# Patient Record
Sex: FEMALE | Race: WHITE | NOT HISPANIC OR LATINO | Employment: OTHER | URBAN - METROPOLITAN AREA
[De-identification: names, ages, dates, MRNs, and addresses within clinical notes are randomized per-mention and may not be internally consistent; named-entity substitution may affect disease eponyms.]

---

## 2018-01-18 ENCOUNTER — OFFICE VISIT CONVERTED (OUTPATIENT)
Dept: GASTROENTEROLOGY | Facility: CLINIC | Age: 53
End: 2018-01-18
Attending: INTERNAL MEDICINE

## 2019-02-07 ENCOUNTER — OFFICE VISIT CONVERTED (OUTPATIENT)
Dept: GASTROENTEROLOGY | Facility: CLINIC | Age: 54
End: 2019-02-07
Attending: PHYSICIAN ASSISTANT

## 2019-03-06 ENCOUNTER — HOSPITAL ENCOUNTER (OUTPATIENT)
Dept: GENERAL RADIOLOGY | Facility: HOSPITAL | Age: 54
Discharge: HOME | End: 2019-03-06
Attending: NURSE PRACTITIONER

## 2019-03-21 ENCOUNTER — CONVERSION ENCOUNTER (OUTPATIENT)
Dept: NEUROLOGY | Facility: CLINIC | Age: 54
End: 2019-03-21

## 2019-03-21 ENCOUNTER — OFFICE VISIT CONVERTED (OUTPATIENT)
Dept: NEUROSURGERY | Facility: CLINIC | Age: 54
End: 2019-03-21
Attending: PHYSICIAN ASSISTANT

## 2019-04-03 ENCOUNTER — HOSPITAL ENCOUNTER (OUTPATIENT)
Dept: MRI IMAGING | Facility: HOSPITAL | Age: 54
Discharge: HOME | End: 2019-04-03
Attending: PHYSICIAN ASSISTANT

## 2019-04-17 ENCOUNTER — OFFICE VISIT CONVERTED (OUTPATIENT)
Dept: NEUROSURGERY | Facility: CLINIC | Age: 54
End: 2019-04-17
Attending: PHYSICIAN ASSISTANT

## 2019-05-09 ENCOUNTER — HOSPITAL ENCOUNTER (OUTPATIENT)
Dept: OTHER | Facility: HOSPITAL | Age: 54
Discharge: HOME | End: 2019-05-09
Attending: PHYSICIAN ASSISTANT

## 2019-05-29 ENCOUNTER — OFFICE VISIT CONVERTED (OUTPATIENT)
Dept: NEUROSURGERY | Facility: CLINIC | Age: 54
End: 2019-05-29
Attending: PHYSICIAN ASSISTANT

## 2020-02-12 ENCOUNTER — CONVERSION ENCOUNTER (OUTPATIENT)
Dept: GASTROENTEROLOGY | Facility: CLINIC | Age: 55
End: 2020-02-12

## 2020-02-12 ENCOUNTER — OFFICE VISIT CONVERTED (OUTPATIENT)
Dept: GASTROENTEROLOGY | Facility: CLINIC | Age: 55
End: 2020-02-12
Attending: PHYSICIAN ASSISTANT

## 2020-05-13 ENCOUNTER — TELEPHONE CONVERTED (OUTPATIENT)
Dept: GASTROENTEROLOGY | Facility: CLINIC | Age: 55
End: 2020-05-13
Attending: PHYSICIAN ASSISTANT

## 2020-07-23 ENCOUNTER — OFFICE VISIT CONVERTED (OUTPATIENT)
Dept: ORTHOPEDIC SURGERY | Facility: CLINIC | Age: 55
End: 2020-07-23
Attending: ORTHOPAEDIC SURGERY

## 2020-10-15 ENCOUNTER — OFFICE VISIT CONVERTED (OUTPATIENT)
Dept: ORTHOPEDIC SURGERY | Facility: CLINIC | Age: 55
End: 2020-10-15
Attending: ORTHOPAEDIC SURGERY

## 2020-12-14 ENCOUNTER — OFFICE VISIT CONVERTED (OUTPATIENT)
Dept: NEUROSURGERY | Facility: CLINIC | Age: 55
End: 2020-12-14
Attending: PHYSICIAN ASSISTANT

## 2021-01-04 ENCOUNTER — HOSPITAL ENCOUNTER (OUTPATIENT)
Dept: MRI IMAGING | Facility: HOSPITAL | Age: 56
Discharge: HOME | End: 2021-01-04
Attending: PHYSICIAN ASSISTANT

## 2021-01-14 ENCOUNTER — OFFICE VISIT CONVERTED (OUTPATIENT)
Dept: GASTROENTEROLOGY | Facility: CLINIC | Age: 56
End: 2021-01-14
Attending: NURSE PRACTITIONER

## 2021-01-27 ENCOUNTER — OFFICE VISIT CONVERTED (OUTPATIENT)
Dept: NEUROSURGERY | Facility: CLINIC | Age: 56
End: 2021-01-27
Attending: PHYSICIAN ASSISTANT

## 2021-05-10 NOTE — H&P
History and Physical      Patient Name: Leonel Henderson   Patient ID: [id number]   Sex: Female   YOB: 1965    Primary Care Provider: Tracey MANZANO   Referring Provider: Tracey MANZANO    Visit Date: July 23, 2020    Provider: Arina Vaughn MD   Location: [facility]   Location Address: [address] [place] [address]  [id number]   Location Phone: [phone]          Chief Complaint  · Bilateral hand pain      History Of Present Illness  Leonel Henderson is a 55 year old /White female who presents today to [facility].      The patient presents today for evaluation of bilateral hands. She reports the hands feel swollen to her but don't appear swollen. She reports the second and third finger on the right rotation when she makes a fist. She has some numbness and tingling to her fourth and fifth fingers. She had a previous carpal tunnel release by myself about 4 to 5 years ago and that is doing reasonably well.       Past Medical History  Abdominal pain; Aftercare following left carpal tunnel release; Arthritis; Asthma; Bladder Disorder; Bladder problem; Bowel Disease; Cancer; Carpal tunnel syndrome; Constipation; Degenerative Disc Disease ; GERD (gastroesophageal reflux disease); Hyperlipemia; Hyperlipidemia; Hypertension; Hypertension, Benign Essential; Leg pain; Lumbago/low back pain; Seasonal allergies         Past Surgical History  carpal tunnel; Carpal Tunnel Release; Cesarian Section; Cholecystecomy; Colon; Colonoscopy; EGD; Gallbladder; Hysterectomy; Tubal ligation         Medication List  amitriptyline 75 mg oral tablet; Aspir-81 81 mg oral tablet,delayed release (DR/EC); baclofen 10 mg oral tablet; Benadryl 25 mg oral capsule; colestipol 1 gram oral tablet; diclofenac sodium 75 mg oral tablet,delayed release (DR/EC); dicyclomine 20 mg oral tablet; duloxetine 30 mg oral capsule,delayed release(DR/EC); hyoscyamine sulfate 0.125 mg oral tablet;  "hyoscyamine sulfate 0.375 mg oral tablet extended release 12 hr; ibuprofen 800 mg oral tablet; Librax (with clidinium) 5-2.5 mg oral capsule; lisinopril 40 mg oral tablet; Movantik 25 mg oral tablet; Norco 7.5-325 mg oral tablet; pantoprazole 40 mg oral tablet,delayed release (DR/EC); pravastatin 40 mg oral tablet; propantheline 15 mg oral tablet; tizanidine 4 mg oral capsule; Xifaxan 550 mg oral tablet         Allergy List  NO KNOWN DRUG ALLERGIES       Allergies Reconciled  Family Medical History  Diabetes, unspecified type; - No Family History of Colorectal Cancer; Family history of certain chronic disabling diseases; arthritis; Family history of Arthritis; Family history of cancer; Family history of heart disease; Family history of diabetes mellitus         Social History  Alcohol (Never); Alcohol Use (Never); lives with spouse; ; .; Recreational Drug Use (Never); Tobacco (Never); Working         Review of Systems  · Constitutional  o Denies  o : fever, chills, weight loss  · Cardiovascular  o Denies  o : chest pain, shortness of breath  · Gastrointestinal  o Denies  o : liver disease, heartburn, nausea, blood in stools  · Genitourinary  o Denies  o : painful urination, blood in urine  · Integument  o Denies  o : rash, itching  · Neurologic  o Denies  o : headache, weakness, loss of consciousness  · Musculoskeletal  o Denies  o : painful, swollen joints  · Psychiatric  o Denies  o : drug/alcohol addiction, anxiety, depression      Vitals  Date Time BP Position Site L\R Cuff Size HR RR TEMP (F) WT  HT  BMI kg/m2 BSA m2 O2 Sat        07/23/2020 01:55 PM      81 - R   250lbs 0oz 5'  2\" 45.73 2.23 97 %          Physical Examination  · Constitutional  o Appearance  o : well developed, well-nourished, no obvious deformities present  · Head and Face  o Head  o :   § Inspection  § : normocephalic  o Face  o :   § Inspection  § : no facial lesions  · Eyes  o Conjunctivae  o : conjunctivae " normal  o Sclerae  o : sclerae white  · Ears, Nose, Mouth and Throat  o Ears  o :   § External Ears  § : appearance within normal limits  § Hearing  § : intact  o Nose  o :   § External Nose  § : appearance normal  · Neck  o Inspection/Palpation  o : normal appearance  o Range of Motion  o : full range of motion  · Respiratory  o Respiratory Effort  o : breathing unlabored  o Inspection of Chest  o : normal appearance  o Auscultation of Lungs  o : no audible wheezing or rales  · Cardiovascular  o Heart  o : regular rate  · Gastrointestinal  o Abdominal Examination  o : soft and non-tender  · Skin and Subcutaneous Tissue  o General Inspection  o : intact, no rashes  · Psychiatric  o General  o : Alert and oriented x3  o Judgement and Insight  o : judgment and insight intact  o Mood and Affect  o : mood normal, affect appropriate  · Extremities  o Extremities  o : scars are well-healed, good ROM, no obvious malrotation with making a fist, complains of some numbness and tingling to the 4th and 5th digits, negative Tinel's at elbow, sensation is otherwise intact          Assessment  · Pain: Wrist and hands, bilateral     719.43/M25.539  · Cubital tunnel syndrome of both upper extremities     354.2/G56.23      Plan  · Instructions  o Reviewed the patient's Past Medical, Social, and Family history as well as the ROS at today's visit, no changes.  o Call or return if worsening symptoms.  o Follow up as needed.  o This note was transcribed by Jacinta Gomez.   o Plan for anti-inflammatory to help calm down her hand osteoarthritis. May consider EMG for cubital tunnel if symptoms worsen.             Electronically Signed by: Jacinta Gomez-, Other -Author on July 24, 2020 11:13:11 PM  Electronically Co-signed by: Arina Vaughn MD -Reviewer on July 31, 2020 08:54:25 AM

## 2021-05-13 NOTE — PROGRESS NOTES
Progress Note      Patient Name: Leonel Henderson   Patient ID: [id number]   Sex: Female   YOB: 1965    Primary Care Provider: Tracey MANZANO   Referring Provider: Tracey MANZANO    Visit Date: December 14, 2020    Provider: Kimberly Dunn PA-C   Location: [facility]   Location Address: [address] [place] [address]  [id number]   Location Phone: [phone]          Chief Complaint  · Lumbago      History Of Present Illness     Patient presents today with chronic, worsening low back pain. Patient states the pain she is experiencing is effecting both her upper and lower extremities bilaterally. Patient states she experiences a significant amount of numbness and tingling in the hands and feet and that her legs and arms keep a constant aching, cramping feeling in them. Pt notes that she has been having numbness in little two fingers bilaterally for 5 months. She complains of tingling in feet and pain in calves for 3 months.  EMG showed carpal tunnel syndrome.       Past Medical History  Abdominal pain; Aftercare following left carpal tunnel release; Arthritis; Asthma; Bladder Disorder; Bladder problem; Bowel Disease; Cancer; Carpal tunnel syndrome; Constipation; Degenerative Disc Disease ; GERD (gastroesophageal reflux disease); Hyperlipemia; Hyperlipidemia; Hypertension; Hypertension, Benign Essential; Leg pain; Lumbago/low back pain; Seasonal allergies         Past Surgical History  carpal tunnel; Carpal Tunnel Release; Cesarian Section; Cholecystecomy; Colon; Colonoscopy; EGD; Gallbladder; Hysterectomy; Tubal ligation         Medication List  amitriptyline 75 mg oral tablet; Aspir-81 81 mg oral tablet,delayed release (DR/EC); baclofen 10 mg oral tablet; Benadryl 25 mg oral capsule; colestipol 1 gram oral tablet; diclofenac sodium 75 mg oral tablet,delayed release (DR/EC); DICLOFENAC SODIUM 75MG DR TABLETS; dicyclomine 20 mg oral tablet; docusate  sodium 100 mg oral capsule; doxycycline hyclate 100 mg oral capsule; duloxetine 30 mg oral capsule,delayed release(DR/EC); hydrocodone-acetaminophen 7.5-325 mg oral tablet; hyoscyamine sulfate 0.125 mg oral tablet; hyoscyamine sulfate 0.375 mg oral tablet extended release 12 hr; ibuprofen 800 mg oral tablet; Librax (with clidinium) 5-2.5 mg oral capsule; lisinopril 40 mg oral tablet; montelukast 10 mg oral tablet; Movantik 25 mg oral tablet; Norco 7.5-325 mg oral tablet; pantoprazole 40 mg oral tablet,delayed release (DR/EC); pravastatin 40 mg oral tablet; propantheline 15 mg oral tablet; tizanidine 4 mg oral capsule; Xifaxan 550 mg oral tablet         Allergy List  NO KNOWN DRUG ALLERGIES         Family Medical History  Diabetes, unspecified type; - No Family History of Colorectal Cancer; Family history of certain chronic disabling diseases; arthritis; Family history of Arthritis; Family history of cancer; Family history of heart disease; Family history of diabetes mellitus         Social History  Alcohol (Never); Alcohol Use (Never); lives with spouse; ; .; Recreational Drug Use (Never); Tobacco (Never); Working         Review of Systems  · Constitutional  o Admits  o : fatigue, weight gain  o Denies  o : fever, headache, chills  · Eyes  o Denies  o : eye pain, double vision, blurred vision  · HENT  o Admits  o : headaches, sinus pain, nasal congestion, ear pain  o Denies  o : sinus problems, sore throat, ear infection  · Cardiovascular  o Denies  o : chest pain, high blood pressure, varicosities  · Respiratory  o Denies  o : shortness of breath, wheezing, frequent cough  · Gastrointestinal  o Denies  o : nausea, vomiting, heartburn, indigestion, abdominal pain  · Genitourinary  o Denies  o : urgency, frequency, urinary retention, painful urination  · Integument  o Denies  o : rash, itching, boils  · Neurologic  o Admits  o : tingling or numbness, loss of balance  o Denies  o : tremors, dizzy  "spells  · Musculoskeletal  o Admits  o : sciatica (pain radiating to leg), back pain, neck pain  · Endocrine  o Denies  o : cold intolerance, heat intolerance, tired, excessive thirst, sluggish  · Psychiatric  o Admits  o : depression, feels satisfied with life  o Denies  o : severe depression, concerns with hurting themselves  · Heme-Lymph  o Denies  o : swollen glands, blood clotting problems  · Allergic-Immunologic  o Denies  o : sinus allergy symptoms, hay fever      Vitals  Date Time BP Position Site L\R Cuff Size HR RR TEMP (F) WT  HT  BMI kg/m2 BSA m2 O2 Sat FR L/min FiO2 HC       12/14/2020 01:53 /57 Sitting    76 - R  97.5 277lbs 5oz 5'  2\" 50.72 2.35             Physical Examination     Unable to make fists bilaterally. Altered sensation in two small fingers bilaterally. Altered sensation on bottom of feet. Normal reflexes in arms and legs. Normal strength in arms and legs, except for  strength is weakened. Tenderness diffusely in lower back. Tenderness at C6 level in neck.           Assessment  · Lumbago/low back pain     724.3/M54.40  · Sciatica     724.3/M54.30  · Facet arthritis of lumbar region     721.3/M46.96  · Paresthesia     782.0/R20.2  hands and feet  · Cervicalgia     723.1/M54.2      Plan  · Orders  o MRI lumbar spine w/o contrast (71267) - 724.3/M54.40, 724.3/M54.30, 721.3/M46.96, 782.0/R20.2 - 12/14/2020  o MRI cervical spine w/o contrast (58510) - 723.1/M54.2, 782.0/R20.2 - 12/14/2020  · Medications  o Medications have been Reconciled  o Transition of Care or Provider Policy  · Instructions  o Will order MRI of Lspine and MRI of neck and return afterwards. Pt is having worsening paresthesia in feet and hands.   o Electronically Identified Patient Education Materials Provided Electronically            Electronically Signed by: ARNEL JamesC -Author on December 14, 2020 02:50:11 PM  "

## 2021-05-13 NOTE — PROGRESS NOTES
Progress Note      Patient Name: Leonel Henderson   Patient ID: [id number]   Sex: Female   YOB: 1965    Primary Care Provider: Tracey MANZANO   Referring Provider: Tracey MANZANO    Visit Date: October 15, 2020    Provider: Arina Vaughn MD   Location: [facility]   Location Address: [address] [place] [address]  [id number]   Location Phone: [phone]          Chief Complaint  · Follow up right upper extremity      History Of Present Illness  Leonel Henderson is a 55 year old /White female who presents today to [facility]. Patient is following-up for bilateral hand pain, bilateral elbow pain. Patient states numbness and tingling in the 4th and 5th digits. Patient states pain on the lateral side of each elbow that is severe at times. Patient has history of bilateral carpal tunnel release in 2015. Patient had EMG study that states moderate degree of median nerve entrapment bilaterally.       Past Medical History  Abdominal pain; Aftercare following left carpal tunnel release; Arthritis; Asthma; Bladder Disorder; Bladder problem; Bowel Disease; Cancer; Carpal tunnel syndrome; Constipation; Degenerative Disc Disease ; GERD (gastroesophageal reflux disease); Hyperlipemia; Hyperlipidemia; Hypertension; Hypertension, Benign Essential; Leg pain; Lumbago/low back pain; Seasonal allergies         Past Surgical History  carpal tunnel; Carpal Tunnel Release; Cesarian Section; Cholecystecomy; Colon; Colonoscopy; EGD; Gallbladder; Hysterectomy; Tubal ligation         Medication List  amitriptyline 75 mg oral tablet; Aspir-81 81 mg oral tablet,delayed release (DR/EC); baclofen 10 mg oral tablet; Benadryl 25 mg oral capsule; colestipol 1 gram oral tablet; diclofenac sodium 75 mg oral tablet,delayed release (DR/EC); DICLOFENAC SODIUM 75MG DR TABLETS; dicyclomine 20 mg oral tablet; duloxetine 30 mg oral capsule,delayed release(DR/EC); hyoscyamine sulfate 0.125 mg  "oral tablet; hyoscyamine sulfate 0.375 mg oral tablet extended release 12 hr; ibuprofen 800 mg oral tablet; Librax (with clidinium) 5-2.5 mg oral capsule; lisinopril 40 mg oral tablet; Movantik 25 mg oral tablet; Norco 7.5-325 mg oral tablet; pantoprazole 40 mg oral tablet,delayed release (DR/EC); pravastatin 40 mg oral tablet; propantheline 15 mg oral tablet; tizanidine 4 mg oral capsule; Xifaxan 550 mg oral tablet         Allergy List  NO KNOWN DRUG ALLERGIES         Family Medical History  Diabetes, unspecified type; - No Family History of Colorectal Cancer; Family history of certain chronic disabling diseases; arthritis; Family history of Arthritis; Family history of cancer; Family history of heart disease; Family history of diabetes mellitus         Social History  Alcohol (Never); Alcohol Use (Never); lives with spouse; ; .; Recreational Drug Use (Never); Tobacco (Never); Working         Review of Systems  · Constitutional  o Denies  o : fever, chills, weight loss  · Cardiovascular  o Denies  o : chest pain, shortness of breath  · Gastrointestinal  o Denies  o : liver disease, heartburn, nausea, blood in stools  · Genitourinary  o Denies  o : painful urination, blood in urine  · Integument  o Denies  o : rash, itching  · Neurologic  o Denies  o : headache, weakness, loss of consciousness  · Musculoskeletal  o Admits  o : painful, swollen joints  · Psychiatric  o Admits  o : depression  o Denies  o : drug/alcohol addiction, anxiety      Vitals  Date Time BP Position Site L\R Cuff Size HR RR TEMP (F) WT  HT  BMI kg/m2 BSA m2 O2 Sat FR L/min FiO2        10/15/2020 02:12 PM      89 - R   250lbs 16oz 5'  2\" 45.91 2.23 97 %            Physical Examination  · Constitutional  o Appearance  o : well developed, well-nourished, no obvious deformities present  · Head and Face  o Head  o :   § Inspection  § : normocephalic  o Face  o :   § Inspection  § : no facial lesions  · Eyes  o Conjunctivae  o : " conjunctivae normal  o Sclerae  o : sclerae white  · Ears, Nose, Mouth and Throat  o Ears  o :   § External Ears  § : appearance within normal limits  § Hearing  § : intact  o Nose  o :   § External Nose  § : appearance normal  · Neck  o Inspection/Palpation  o : normal appearance  o Range of Motion  o : full range of motion  · Respiratory  o Respiratory Effort  o : breathing unlabored  o Inspection of Chest  o : normal appearance  o Auscultation of Lungs  o : no audible wheezing or rales  · Cardiovascular  o Heart  o : regular rate  · Gastrointestinal  o Abdominal Examination  o : soft and non-tender  · Skin and Subcutaneous Tissue  o General Inspection  o : intact, no rashes  · Psychiatric  o General  o : Alert and oriented x3  o Judgement and Insight  o : judgment and insight intact  o Mood and Affect  o : mood normal, affect appropriate  · Right Elbow  o Inspection  o : No skin discoloration, atrophy or swelling. Palpable tenderness over the lateral epicondyle, common extensor tendons. She has full range of motion. Neurovascularly and sensation grossly intact.   · Left Elbow  o Inspection  o : No skin discoloration, atrophy or swelling. Palpable tenderness over the lateral epicondyle, common extensor tendons. She has full range of motion. Neurovascularly and sensation grossly intact.   · Extremities  o Extremities  o : BILATERAL HANDS Scars are present. No swelling. No skin discoloration. Mild pain in the 4th and 5th digits. She is able to make a fist.          Assessment  · Bilateral Carpal Tunnel Syndrome     354.0/G56.00  · Pain: Bilateral Arm     729.5/M79.603  · Pain: Shoulder     719.41/M25.519  · Bilateral epicondylitis, lateral     726.32/M77.10      Plan  · Medications  o Medications have been Reconciled  o Transition of Care or Provider Policy  · Instructions  o Reviewed the patient's Past Medical, Social, and Family history as well as the ROS at today's visit, no changes.  o Call or return if  worsening symptoms.  o This note is transcribed by Ana staples/[email or web address]  o We discussed physical therapy. Patient would like home exercises at this time.             Electronically Signed by: Ana Ibrahim, -Author on October 16, 2020 11:20:40 AM  Electronically Co-signed by: Elle Gracia PA-C -Reviewer on October 16, 2020 01:00:23 PM  Electronically Co-signed by: Arina Vaughn MD -Reviewer on October 17, 2020 09:04:49 AM

## 2021-05-13 NOTE — PROGRESS NOTES
Quick Note      Patient Name: Leonel Henderson   Patient ID: [id number]   Sex: Female   YOB: 1965    Primary Care Provider: Tracey MANZANO   Referring Provider: Tracey MANZANO    Visit Date: May 13, 2020    Provider: Alf Tavares PA-C   Location: [facility]   Location Address: [address] [place] [address]  [id number]   Location Phone: [phone]          History Of Present Illness  TELEHEALTH TELEPHONE VISIT  Chief Complaint: 3 month follow up   Leonel Henderson is a 54 year old /White female who is presenting for evaluation via telehealth telephone visit. Verbal consent obtained before beginning visit.   Provider spent 11 minutes with the patient during telehealth visit.   The following staff were present during this visit: Bharath Goodson MA   Past Medical History/Overview of Patient Symptoms     54-year-old female with history of nonalcoholic fatty liver disease, colon polyps, esophageal reflux, cholecystectomy, and IBS follows agrees to a telephone visit for her 3 month followup.  Over the last 6 months she was started on Norco.  She developed constipation was given Movantik which improved her constipation.  She has also been able to successfully taper off PPI therapy.  She does complain of frequent bloating.  She is also having lower abdominal cramping. Review of the colonoscopy/EGD by Dr. Thompson 10/2016 showed diverticulosis in the sigmoid colon, internal hemorrhoids, hyperplastic polyp removed from the hepatic flexure.  Her insurance wouldn't cover librax or levsin.  Bentyl has worked in the past.           Assessment  · GERD (gastroesophageal reflux disease)     530.81/K21.9  · IBS (irritable bowel syndrome)     564.1/K58.9  · Personal history of colonic polyps     V12.72/Z86.010      Plan  · Orders  o Physician Telephone Evaluation, 11-20 minutes (26892) - 530.81/K21.9, 564.1/K58.9, V12.72/Z86.010 - 05/13/2020  · Medications  o Xifaxan 550 mg oral  tablet   SIG: take 1 tablet (550 mg) by oral route 3 times per day for 14 days   DISP: (42) tablets with 0 refills  Prescribed on 05/13/2020     o Medications have been Reconciled  o Transition of Care or Provider Policy  · Instructions  o Plan Of Care: 54 year old female with IBS. I will resume her benytl to take as needed. Her insurance would not approve levsin or librax. I will add a trial of xifaxin 550mg TID x 14 days. However, this may not be approved. She will call for worsening symptoms.  o Chronic conditions reviewed and taken into consideration for today's treatment plan.  o Patient instructed to seek medical attention urgently for new or worsening symptoms.  o Patient was educated/instructed on their diagnosis, treatment and medications prior to discharge from the clinic today.            Electronically Signed by: Alf Tavares PA-C -Author on May 13, 2020 10:38:08 AM  Electronically Co-signed by: Keegan Thompson MD -Reviewer on May 13, 2020 04:30:32 PM

## 2021-05-14 VITALS
OXYGEN SATURATION: 100 % | HEART RATE: 74 BPM | SYSTOLIC BLOOD PRESSURE: 119 MMHG | WEIGHT: 270 LBS | DIASTOLIC BLOOD PRESSURE: 57 MMHG | RESPIRATION RATE: 16 BRPM | HEIGHT: 61 IN | BODY MASS INDEX: 50.98 KG/M2

## 2021-05-14 VITALS
TEMPERATURE: 97.5 F | SYSTOLIC BLOOD PRESSURE: 132 MMHG | DIASTOLIC BLOOD PRESSURE: 57 MMHG | WEIGHT: 277.31 LBS | BODY MASS INDEX: 51.03 KG/M2 | HEIGHT: 62 IN | HEART RATE: 76 BPM

## 2021-05-14 VITALS — HEIGHT: 62 IN | WEIGHT: 272.12 LBS | BODY MASS INDEX: 50.08 KG/M2 | TEMPERATURE: 97.7 F

## 2021-05-14 VITALS — OXYGEN SATURATION: 97 % | WEIGHT: 251 LBS | HEART RATE: 89 BPM | BODY MASS INDEX: 46.19 KG/M2 | HEIGHT: 62 IN

## 2021-05-14 NOTE — PROGRESS NOTES
Progress Note      Patient Name: Leonel Henderson   Patient ID: [id number]   Sex: Female   YOB: 1965    Primary Care Provider: Tracey MANZANO   Referring Provider: Tracey MANZANO    Visit Date: January 27, 2021    Provider: Rosario Garcia PA-C   Location: [facility]   Location Address: [address] [place] [address]  [id number]   Location Phone: [phone]          Chief Complaint     Follow up with MRI Cervical and Lumbar spine done at [facility].       History Of Present Illness     MRI cervical spine showed moderate central canal stenosis from central disc protrusion at C5/6 with degenerative changes.  MRI lumbar spine showed moderate to severe central canal stenosis at L3/4 with mild do moderate central canal stenosis at L4/5 with degenerative changes.  These were the most notable findings.     Pain in the legs, worse on the right, with standing and walking and improves with sitting.  Pain worse in the thighs.      Numbness in the hands worse in the small and ring fingers.  Some neck pain.      Has failed LESB. Pain is worse in the back than the leg.  Pain worse in the hips.    Had right him trochanteric bursa injection, which helped for around one week.  Scheduled for repeat injection in right hip.       Past Medical History  Aftercare following left carpal tunnel release; Arthritis; Asthma; Bladder problem; Cancer; Carpal tunnel syndrome; Constipation; Degenerative Disc Disease ; GERD (gastroesophageal reflux disease); Hyperlipidemia; Hypertension; Leg pain; Lumbago/low back pain; Seasonal allergies         Past Surgical History  carpal tunnel; Carpal Tunnel Release; Cesarian Section; Cholecystecomy; Colon; Colonoscopy; EGD; Hysterectomy; Tubal ligation         Medication List  amitriptyline 10 mg oral tablet; Aspir-81 81 mg oral tablet,delayed release (DR/EC); baclofen 10 mg oral tablet; DICLOFENAC SODIUM 75MG DR TABLETS; dicyclomine 20 mg oral  tablet; docusate sodium 100 mg oral capsule; doxycycline hyclate 100 mg oral capsule; duloxetine 30 mg oral capsule,delayed release(DR/EC); duloxetine 60 mg oral capsule,delayed release(DR/EC); hydrocodone-acetaminophen 7.5-325 mg oral tablet; ibuprofen 800 mg oral tablet; lisinopril 5 mg oral tablet; montelukast 10 mg oral tablet; Movantik 25 mg oral tablet; oxybutynin chloride 5 mg oral tablet; pravastatin 40 mg oral tablet; tizanidine 4 mg oral capsule; Trelegy Ellipta 100-62.5-25 mcg inhalation blister with device; Xifaxan 550 mg oral tablet         Allergy List  NO KNOWN DRUG ALLERGIES       Allergies Reconciled  Family Medical History  Diabetes, unspecified type; - No Family History of Colorectal Cancer; Family history of certain chronic disabling diseases; arthritis; Family history of Arthritis; Family history of cancer; Family history of heart disease; Family history of diabetes mellitus         Social History  Alcohol (Never); lives with spouse; ; Recreational Drug Use (Never); Tobacco (Never); Working         Review of Systems  · Constitutional  o Denies  o : chills, excessive sweating, fatigue, fever, sycope/passing out, weight gain, weight loss  · Eyes  o Denies  o : changes in vision, blurry vision, double vision  · HENT  o Admits  o : ringing in the ears, ear aches, sinus pain, seasonal allergies  o Denies  o : loss of hearing, sore throat, nasal congestion, nose bleeds  · Cardiovascular  o Denies  o : blood clots, swollen legs, anemia, easy burising or bleeding, transfusions  · Respiratory  o Admits  o : shortness of breath  o Denies  o : dry cough, productive cough, pneumonia, COPD  · Gastrointestinal  o Denies  o : difficulty swallowing, reflux  · Genitourinary  o Denies  o : incontinence  · Neurologic  o Admits  o : headache, dizziness/vertigo, difficulty with sleep, numbness/tingling/paresthesia   o Denies  o : seizure, stroke, tremor, loss of balance, falls, difficulty with coordination,  "difficulty with dexterity, weakness  · Musculoskeletal  o Admits  o : neck stiffness/pain, muscle aches, joint pain, spasms, sciatica, pain radiating in leg, low back pain, hip pain  o Denies  o : swollen lymph nodes, weakness, pain radiating in arm  · Endocrine  o Denies  o : diabetes, thyroid disorder  · Psychiatric  o Admits  o : depression  o Denies  o : anxiety  · All Others Negative      Vitals  Date Time BP Position Site L\R Cuff Size HR RR TEMP (F) WT  HT  BMI kg/m2 BSA m2 O2 Sat FR L/min FiO2 HC       01/27/2021 01:56 PM        97.7 272lbs 2oz 5'  2\" 49.77 2.32             Physical Examination     ttp over the trochanteric bursa bilaterally.      Gait and station are wnl    Motor 5/5 and sensation intact in lower extremities           Assessment  · Lumbago/low back pain     724.3/M54.40  · Sciatica     724.3/M54.30  · Facet arthritis of lumbar region     721.3/M46.96  · Paresthesia     782.0/R20.2  hands and feet  · Cervicalgia     723.1/M54.2  · Hip pain     719.45/M25.559      Plan  · Orders  o Bilateral Hip xray with AP Pelvis (13350) - 719.45/M25.559 - 01/27/2021  · Medications  o Medications have been Reconciled  o Transition of Care or Provider Policy  · Instructions  o I will send her for bilateral hip xrays and proceed with second right hip injection. Cervical spine surgery not recommended. Discussed a lumbar MIL at L3/4 but pain worse in the back and the hips so this is not likely to reduce her pain.             Electronically Signed by: ARNEL EstrellaC -Author on January 27, 2021 02:32:18 PM  "

## 2021-05-14 NOTE — PROGRESS NOTES
Progress Note      Patient Name: Leonel Henderson   Patient ID: [id number]   Sex: Female   YOB: 1965    Primary Care Provider: Tracey MANZANO   Referring Provider: Tracey MANZANO    Visit Date: January 14, 2021    Provider: JOSE JUAN Sheikh   Location: [facility]   Location Address: [address] [place] [address]  [place]  [id number]   Location Phone: [phone]          Chief Complaint  · Follow-up      History Of Present Illness     55-year-old female with a history of NAFLD, GERD, IBS, and colon polyps returns for follow-up visit.    She was given a trial of Xifaxan 550 mg 3 times daily x14 days at last office visit, but she was unable to  the medication due to insurance. She is still having a lot of bloating and gas that occurs after eating. She is taking hyoscyamine 0.375 mg twice daily, but is not effective for abdominal cramping. She is taking Movantik, which produces 2-5 bowel movements a day. Without Movantik, she will have 1 bowel movement per day that is very hard. Her EGD/colonoscopy by Dr. Thompson 2016 revealed diverticulosis in the sigmoid, internal hemorrhoids, and a hyperplastic polyp removed from hepatic flexure.  Recommend repeat colonoscopy 2021.         Past Medical History  Aftercare following left carpal tunnel release; Arthritis; Asthma; Bladder problem; Cancer; Carpal tunnel syndrome; Constipation; Degenerative Disc Disease ; GERD (gastroesophageal reflux disease); Hyperlipidemia; Hypertension; Leg pain; Lumbago/low back pain; Seasonal allergies         Past Surgical History  carpal tunnel; Carpal Tunnel Release; Cesarian Section; Cholecystecomy; Colon; Colonoscopy; EGD; Hysterectomy; Tubal ligation         Medication List  amitriptyline 75 mg oral tablet; Aspir-81 81 mg oral tablet,delayed release (DR/EC); baclofen 10 mg oral tablet; DICLOFENAC SODIUM 75MG DR TABLETS; docusate sodium 100 mg oral capsule; doxycycline hyclate 100  "mg oral capsule; duloxetine 30 mg oral capsule,delayed release(DR/EC); hydrocodone-acetaminophen 7.5-325 mg oral tablet; hyoscyamine sulfate 0.375 mg oral tablet extended release 12 hr; ibuprofen 800 mg oral tablet; lisinopril 40 mg oral tablet; montelukast 10 mg oral tablet; Movantik 25 mg oral tablet; pravastatin 40 mg oral tablet; tizanidine 4 mg oral capsule; Trelegy Ellipta 100-62.5-25 mcg inhalation blister with device         Allergy List  NO KNOWN DRUG ALLERGIES       Allergies Reconciled  Family Medical History  Diabetes, unspecified type; - No Family History of Colorectal Cancer; Family history of certain chronic disabling diseases; arthritis; Family history of Arthritis; Family history of cancer; Family history of heart disease; Family history of diabetes mellitus         Social History  Alcohol (Never); lives with spouse; ; Recreational Drug Use (Never); Tobacco (Never); Working         Review of Systems  · Constitutional  o Denies  o : chills, fever  · Cardiovascular  o Denies  o : chest pain  · Respiratory  o Denies  o : shortness of breath  · Gastrointestinal  o Denies  o : nausea, vomiting, dysphagia  · Endocrine  o Denies  o : weight gain, weight loss      Vitals  Date Time BP Position Site L\R Cuff Size HR RR TEMP (F) WT  HT  BMI kg/m2 BSA m2 O2 Sat FR L/min FiO2 HC       01/14/2021 11:33 /57 Sitting    74 - R 16  270lbs 0oz 5'  1\" 51.02 2.3 100 %            Physical Examination  · Constitutional  o Appearance  o : obese, well developed  · Head and Face  o Head  o : Normocephalic with no worriesome skin lesions  · Eyes  o Vision  o :   § Visual Fields  § : eyes move symmetrical in all directions  o Sclerae  o : sclerae anicteric  · Neck  o Inspection/Palpation  o : Trachea is midline, no adenopathy  · Respiratory  o Respiratory Effort  o : Breathing is unlabored.  o Inspection of Chest  o : normal appearance  o Auscultation of Lungs  o : Chest is clear to auscultation " bilaterally.  · Cardiovascular  o Heart  o :   § Auscultation of Heart  § : no murmurs, rubs, or gallops  o Peripheral Vascular System  o :   § Extremities  § : no cyanosis, clubbing or edema;   · Gastrointestinal  o Abdominal Examination  o : Abdomen is soft, nontender to palpation, with normal active bowel sounds, no appreciable hepatosplenomegaly.          Assessment  · Colonic Polyps, Personal History of     V12.72  · Gastroesophageal Reflux     530.81/K21.9  · Irritable Bowel Syndrome     564.1/K58.9  · NAFLD     571.8      Plan  · Medications  o Xifaxan 550 mg oral tablet   SIG: take 1 tablet (550 mg) by oral route 3 times per day for 14 days   DISP: (42) Tablet with 0 refills  Prescribed on 01/14/2021     o dicyclomine 20 mg oral tablet   SIG: take 1 tablet by oral route TID PRN for abdominal pain   DISP: (90) Tablet with 3 refills  Prescribed on 01/14/2021     o hyoscyamine sulfate 0.375 mg oral tablet extended release 12 hr   SIG: take 1 tablet (0.375 mg) by oral route every 12 hours for 30 days   DISP: (60) tablets with 3 refills  Discontinued on 01/14/2021     o Medications have been Reconciled  o Transition of Care or Provider Policy  · Instructions  o 55-year-old female with a history of NAFLD, GERD, IBS, colon polyps returns for follow-up. I am going to switch her from hyoscyamine to dicyclomine 20 mg 3 times daily as needed for abdominal pain. I have also given her handout on a low FODMAP diet. I am going to send in Xifaxan, and hopefully her insurance will cover it. She is going to follow-up in 3 months.            Electronically Signed by: JOSE JUAN Sheikh -Author on January 14, 2021 11:50:37 AM  Electronically Co-signed by: Keegan Thompson MD -Reviewer on January 14, 2021 08:46:05 PM

## 2021-05-15 VITALS — BODY MASS INDEX: 50.79 KG/M2 | HEIGHT: 62 IN | HEART RATE: 76 BPM | WEIGHT: 276 LBS

## 2021-05-15 VITALS
HEART RATE: 71 BPM | SYSTOLIC BLOOD PRESSURE: 125 MMHG | RESPIRATION RATE: 16 BRPM | HEIGHT: 61 IN | WEIGHT: 249 LBS | DIASTOLIC BLOOD PRESSURE: 66 MMHG | BODY MASS INDEX: 47.01 KG/M2

## 2021-05-15 VITALS
RESPIRATION RATE: 16 BRPM | WEIGHT: 258 LBS | BODY MASS INDEX: 47.48 KG/M2 | HEART RATE: 83 BPM | SYSTOLIC BLOOD PRESSURE: 108 MMHG | HEIGHT: 62 IN | OXYGEN SATURATION: 99 % | DIASTOLIC BLOOD PRESSURE: 63 MMHG

## 2021-05-15 VITALS — BODY MASS INDEX: 46.01 KG/M2 | HEIGHT: 62 IN | WEIGHT: 250 LBS | HEART RATE: 81 BPM | OXYGEN SATURATION: 97 %

## 2021-05-15 VITALS — BODY MASS INDEX: 51.16 KG/M2 | HEIGHT: 62 IN | HEART RATE: 74 BPM | WEIGHT: 278 LBS

## 2021-05-15 VITALS — HEIGHT: 62 IN | BODY MASS INDEX: 50.42 KG/M2 | HEART RATE: 77 BPM | WEIGHT: 274 LBS

## 2021-05-16 VITALS
BODY MASS INDEX: 48.41 KG/M2 | HEIGHT: 62 IN | SYSTOLIC BLOOD PRESSURE: 155 MMHG | HEART RATE: 86 BPM | OXYGEN SATURATION: 98 % | RESPIRATION RATE: 12 BRPM | WEIGHT: 263.06 LBS | DIASTOLIC BLOOD PRESSURE: 94 MMHG

## 2021-06-22 RX ORDER — DICLOFENAC SODIUM 75 MG/1
TABLET, DELAYED RELEASE ORAL
Qty: 60 TABLET | OUTPATIENT
Start: 2021-06-22

## 2021-09-02 ENCOUNTER — OFFICE VISIT (OUTPATIENT)
Dept: GASTROENTEROLOGY | Facility: CLINIC | Age: 56
End: 2021-09-02

## 2021-09-02 ENCOUNTER — PREP FOR SURGERY (OUTPATIENT)
Dept: OTHER | Facility: HOSPITAL | Age: 56
End: 2021-09-02

## 2021-09-02 VITALS
HEIGHT: 62 IN | HEART RATE: 84 BPM | OXYGEN SATURATION: 98 % | WEIGHT: 265.5 LBS | DIASTOLIC BLOOD PRESSURE: 62 MMHG | SYSTOLIC BLOOD PRESSURE: 129 MMHG | BODY MASS INDEX: 48.86 KG/M2

## 2021-09-02 DIAGNOSIS — K21.9 GASTROESOPHAGEAL REFLUX DISEASE, UNSPECIFIED WHETHER ESOPHAGITIS PRESENT: ICD-10-CM

## 2021-09-02 DIAGNOSIS — K58.1 IRRITABLE BOWEL SYNDROME WITH CONSTIPATION: Primary | ICD-10-CM

## 2021-09-02 DIAGNOSIS — K76.0 NAFLD (NONALCOHOLIC FATTY LIVER DISEASE): ICD-10-CM

## 2021-09-02 DIAGNOSIS — Z86.010 PERSONAL HISTORY OF COLONIC POLYPS: ICD-10-CM

## 2021-09-02 PROCEDURE — 99214 OFFICE O/P EST MOD 30 MIN: CPT | Performed by: NURSE PRACTITIONER

## 2021-09-02 RX ORDER — DICYCLOMINE HCL 20 MG
20 TABLET ORAL EVERY 6 HOURS
Qty: 90 TABLET | Refills: 3 | Status: SHIPPED | OUTPATIENT
Start: 2021-09-02 | End: 2021-11-30

## 2021-09-02 RX ORDER — DULOXETIN HYDROCHLORIDE 60 MG/1
60 CAPSULE, DELAYED RELEASE ORAL DAILY
COMMUNITY
Start: 2021-07-29

## 2021-09-02 RX ORDER — DIPHENHYDRAMINE HCL 25 MG
1 CAPSULE ORAL DAILY PRN
COMMUNITY

## 2021-09-02 RX ORDER — HYDROCODONE BITARTRATE AND ACETAMINOPHEN 7.5; 325 MG/1; MG/1
1 TABLET ORAL 3 TIMES DAILY
COMMUNITY

## 2021-09-02 RX ORDER — PROMETHAZINE HYDROCHLORIDE 12.5 MG/1
1 TABLET ORAL AS NEEDED
COMMUNITY

## 2021-09-02 RX ORDER — LORATADINE 10 MG/1
1 TABLET ORAL DAILY
COMMUNITY

## 2021-09-02 RX ORDER — ALBUTEROL SULFATE 90 UG/1
1 AEROSOL, METERED RESPIRATORY (INHALATION) 2 TIMES DAILY PRN
COMMUNITY
Start: 2021-06-22

## 2021-09-02 RX ORDER — ASPIRIN 81 MG/1
1 TABLET, CHEWABLE ORAL DAILY
COMMUNITY
Start: 2021-07-05

## 2021-09-02 RX ORDER — DUPILUMAB 200 MG/1.14ML
1 INJECTION, SOLUTION SUBCUTANEOUS
COMMUNITY
Start: 2021-09-01

## 2021-09-02 RX ORDER — PSEUDOEPHEDRINE HCL 30 MG
1 TABLET ORAL DAILY
COMMUNITY

## 2021-09-02 RX ORDER — FLUTICASONE PROPIONATE 50 MCG
1 SPRAY, SUSPENSION (ML) NASAL 2 TIMES DAILY
COMMUNITY
Start: 2021-07-08

## 2021-09-02 RX ORDER — EPINEPHRINE 0.3 MG/.3ML
1 INJECTION SUBCUTANEOUS AS NEEDED
COMMUNITY
Start: 2021-06-09

## 2021-09-02 RX ORDER — PRAVASTATIN SODIUM 40 MG
40 TABLET ORAL
COMMUNITY
Start: 2021-07-05

## 2021-09-02 RX ORDER — MONTELUKAST SODIUM 10 MG/1
1 TABLET ORAL DAILY
COMMUNITY

## 2021-09-02 RX ORDER — IBUPROFEN 800 MG/1
1 TABLET ORAL AS NEEDED
COMMUNITY
Start: 2021-07-05

## 2021-09-02 RX ORDER — POLYETHYLENE GLYCOL 3350 17 G/17G
1 POWDER, FOR SOLUTION ORAL AS NEEDED
COMMUNITY
Start: 2021-07-05

## 2021-09-02 RX ORDER — KETOTIFEN FUMARATE 0.35 MG/ML
1 SOLUTION/ DROPS OPHTHALMIC DAILY PRN
COMMUNITY
Start: 2021-06-09

## 2021-09-02 RX ORDER — LISINOPRIL 20 MG/1
1 TABLET ORAL NIGHTLY
COMMUNITY
Start: 2021-07-05

## 2021-09-02 RX ORDER — DICLOFENAC SODIUM 75 MG/1
1 TABLET, DELAYED RELEASE ORAL DAILY
COMMUNITY
Start: 2021-04-27

## 2021-09-02 RX ORDER — TIZANIDINE 4 MG/1
4 TABLET ORAL EVERY 8 HOURS PRN
COMMUNITY
Start: 2021-08-26

## 2021-09-02 RX ORDER — NALOXEGOL OXALATE 25 MG/1
1 TABLET, FILM COATED ORAL DAILY
COMMUNITY
Start: 2021-08-27

## 2021-09-02 RX ORDER — AZELASTINE 1 MG/ML
1 SPRAY, METERED NASAL 2 TIMES DAILY
COMMUNITY
Start: 2021-06-09

## 2021-09-02 RX ORDER — DOXYCYCLINE HYCLATE 100 MG/1
1 CAPSULE ORAL 2 TIMES DAILY
COMMUNITY

## 2021-09-02 RX ORDER — CETIRIZINE HYDROCHLORIDE 10 MG/1
1 TABLET ORAL DAILY
COMMUNITY
Start: 2021-08-24

## 2021-09-02 RX ORDER — BACLOFEN 10 MG/1
1 TABLET ORAL DAILY
COMMUNITY
Start: 2021-07-05

## 2021-09-02 RX ORDER — OMEPRAZOLE 40 MG/1
40 CAPSULE, DELAYED RELEASE ORAL DAILY
Qty: 30 CAPSULE | Refills: 5 | Status: SHIPPED | OUTPATIENT
Start: 2021-09-02 | End: 2022-03-09

## 2021-09-02 RX ORDER — CYCLOBENZAPRINE HCL 10 MG
1 TABLET ORAL DAILY PRN
COMMUNITY

## 2021-09-02 NOTE — PROGRESS NOTES
Chief Complaint  Heartburn and Irritable Bowel Syndrome    History of Present Illness  Leonel Hameed is a 56 y.o. female who presents to [facility] for follow-up     56-year-old female presenting to the office today for a follow-up with a history of NAFLD, GERD, IBS-mixed and colon polyps.  Patient is currently taking stool softeners, Movantik 25 mg and MiraLAX. Patient takes voltaren 75 mg daily for chronic back pain.  Patient reports that her bowel habits have been more loose recently but she does alternate between constipation and diarrhea.  She uses her MiraLAX and stool softeners as needed.  She takes Movantik daily.  She reports continued abdominal pain diffuse across the lower abdomen and in the epigastric area.  She is not currently on anything for reflux she was on omeprazole in the past.  She has high Cosamin available if needed but she has not taken this in a while.  She has tried Bentyl in the past for abdominal pain but not recently.  Patient denies fever, nausea, vomiting, weight loss, night sweats, melena, hematochezia, hematemesis.    EGD/colonoscopy by Dr. Thompson  revealed diverticulosis in the sigmoid, internal hemorrhoids, and a hyperplastic polyp removed from hepatic flexure.  Recommend repeat colonoscopy .    Past Medical History:   Diagnosis Date   • Aftercare 2017    Following left carpal tunnel release   • Arthritis    • Asthma    • Bladder problem    • Cancer (CMS/HCC)    • Carpal tunnel syndrome    • Constipation    • DDD (degenerative disc disease)    • GERD (gastroesophageal reflux disease)    • HLD (hyperlipidemia)    • HTN (hypertension)    • Leg pain    • Low back pain     Lumbago   • Seasonal allergies        Past Surgical History:   Procedure Laterality Date   • CARPAL TUNNEL RELEASE Bilateral 2016   •  SECTION     • CHOLECYSTECTOMY     • COLON SURGERY  2016   • COLONOSCOPY     • ENDOSCOPY  2016   • HYSTERECTOMY     •  TUBAL ABDOMINAL LIGATION  1997         Current Outpatient Medications:   •  albuterol sulfate  (90 Base) MCG/ACT inhaler, Inhale 1 puff 2 (Two) Times a Day As Needed., Disp: , Rfl:   •  aspirin 81 MG chewable tablet, Chew 1 tablet Daily., Disp: , Rfl:   •  azelastine (ASTELIN) 0.1 % nasal spray, 1 spray into the nostril(s) as directed by provider 2 (Two) Times a Day., Disp: , Rfl:   •  baclofen (LIORESAL) 10 MG tablet, Take 1 tablet by mouth Daily., Disp: , Rfl:   •  cetirizine (zyrTEC) 10 MG tablet, Take 1 tablet by mouth Daily., Disp: , Rfl:   •  cyclobenzaprine (FLEXERIL) 10 MG tablet, Take 1 tablet by mouth Daily As Needed., Disp: , Rfl:   •  diclofenac (VOLTAREN) 75 MG EC tablet, Take 1 tablet by mouth Daily., Disp: , Rfl:   •  diphenhydrAMINE (Benadryl Allergy) 25 mg capsule, Take 1 capsule by mouth Daily As Needed., Disp: , Rfl:   •  docusate sodium 100 MG capsule, Take 1 capsule by mouth Daily., Disp: , Rfl:   •  doxycycline (VIBRAMYCIN) 100 MG capsule, Take 1 capsule by mouth 2 (two) times a day., Disp: , Rfl:   •  DULoxetine (CYMBALTA) 60 MG capsule, Take 60 mg by mouth Daily., Disp: , Rfl:   •  Dupixent 200 MG/1.14ML solution prefilled syringe, Inject 1 dose under the skin into the appropriate area as directed Every 14 (Fourteen) Days., Disp: , Rfl:   •  EPINEPHrine (EPIPEN) 0.3 MG/0.3ML solution auto-injector injection, Inject 1 each under the skin into the appropriate area as directed As Needed., Disp: , Rfl:   •  fluticasone (FLONASE) 50 MCG/ACT nasal spray, 1 spray by Each Nare route 2 (Two) Times a Day. shake liquid, Disp: , Rfl:   •  HYDROcodone-acetaminophen (Norco) 7.5-325 MG per tablet, Take 1 tablet by mouth 3 (Three) Times a Day., Disp: , Rfl:   •  ibuprofen (ADVIL,MOTRIN) 800 MG tablet, Take 1 tablet by mouth As Needed., Disp: , Rfl:   •  ketotifen (ZADITOR) 0.025 % ophthalmic solution, Administer 1 drop to both eyes Daily As Needed., Disp: , Rfl:   •  lisinopril (PRINIVIL,ZESTRIL) 20  "MG tablet, Take 1 tablet by mouth Daily., Disp: , Rfl:   •  loratadine (Claritin) 10 MG tablet, Take 1 tablet by mouth Daily., Disp: , Rfl:   •  metFORMIN (GLUCOPHAGE) 500 MG tablet, Take 1 tablet by mouth 2 (two) times a day., Disp: , Rfl:   •  montelukast (SINGULAIR) 10 MG tablet, Take 1 tablet by mouth Daily., Disp: , Rfl:   •  Movantik 25 MG tablet, Take 1 tablet by mouth Daily., Disp: , Rfl:   •  polyethylene glycol (MIRALAX) 17 GM/SCOOP powder, Take 1 g by mouth As Needed., Disp: , Rfl:   •  pravastatin (PRAVACHOL) 40 MG tablet, Take 40 mg by mouth every night at bedtime., Disp: , Rfl:   •  promethazine (PHENERGAN) 12.5 MG tablet, Take 1 tablet by mouth As Needed., Disp: , Rfl:   •  tiZANidine (ZANAFLEX) 4 MG tablet, Take 4 mg by mouth Every 8 (Eight) Hours As Needed., Disp: , Rfl:   •  Trelegy Ellipta 100-62.5-25 MCG/INH inhaler, Inhale 1 puff Daily., Disp: , Rfl:   •  dicyclomine (BENTYL) 20 MG tablet, Take 1 tablet by mouth Every 6 (Six) Hours., Disp: 90 tablet, Rfl: 3  •  omeprazole (priLOSEC) 40 MG capsule, Take 1 capsule by mouth Daily., Disp: 30 capsule, Rfl: 5  •  polyethylene glycol (GoLYTELY) 236 g solution, Instructions provided in the office. Colon prep., Disp: 4000 mL, Rfl: 0     No Known Allergies    Family History   Problem Relation Age of Onset   • Diabetes Mother    • Arthritis Mother    • Diabetes Father    • Arthritis Father    • Cancer Father    • Diabetes Brother    • Heart disease Brother         Social History     Social History Narrative   • Not on file       Objective         Vital Signs:   /62 (BP Location: Right arm, Patient Position: Sitting, Cuff Size: Adult)   Pulse 84   Ht 157.5 cm (62\")   Wt 120 kg (265 lb 8 oz)   SpO2 98%   BMI 48.56 kg/m²       Physical Exam  Constitutional:       General: She is not in acute distress.     Appearance: Normal appearance.   HENT:      Head: Normocephalic.   Eyes:      Conjunctiva/sclera: Conjunctivae normal.      Pupils: Pupils are " equal, round, and reactive to light.      Visual Fields: Right eye visual fields normal and left eye visual fields normal.   Neck:      Trachea: Trachea normal.   Cardiovascular:      Rate and Rhythm: Normal rate and regular rhythm.      Heart sounds: Normal heart sounds.   Pulmonary:      Effort: Pulmonary effort is normal.      Breath sounds: Normal breath sounds and air entry.      Comments: Inspection of chest: normal appearance  Abdominal:      General: Abdomen is flat. Bowel sounds are normal.      Palpations: Abdomen is soft. There is no mass.      Tenderness: There is no guarding.   Musculoskeletal:      Right lower leg: No edema.      Left lower leg: No edema.   Skin:     Findings: No lesion.      Comments: Turgor is normal   Neurological:      Mental Status: She is alert and oriented to person, place, and time.   Psychiatric:         Mood and Affect: Mood and affect normal.         Result Review :                  Assessment and Plan    Diagnoses and all orders for this visit:    1. Irritable bowel syndrome with constipation (Primary)    2. Gastroesophageal reflux disease, unspecified whether esophagitis present    3. NAFLD (nonalcoholic fatty liver disease)    4. Personal history of colonic polyps    Other orders  -     omeprazole (priLOSEC) 40 MG capsule; Take 1 capsule by mouth Daily.  Dispense: 30 capsule; Refill: 5  -     dicyclomine (BENTYL) 20 MG tablet; Take 1 tablet by mouth Every 6 (Six) Hours.  Dispense: 90 tablet; Refill: 3    56-year-old female presenting to the office today for a follow-up with a history of NAFLD, GERD, IBS-mixed and colon polyps and polypharmacy.  Patient is currently taking stool softeners, Movantik 25 mg and MiraLAX. Patient takes voltaren 75 mg daily for chronic back pain.  Patient is due for screening colonoscopy related to history of colon polyps with patient abdominal pain and epigastric pain I have recommended that the patient undergo further evaluation with an EGD  and colonoscopy.  I have discussed this procedure in detail with the patient.  I have discussed the risks, benefits and alternatives.  I have discussed the risk of anesthesia, bleeding and perforation.  Patient understands these risks, benefits and alternatives and wishes to proceed.  I will schedule her at her earliest convenience.  I have prescribed Prilosec 40 mg to be taken daily and Bentyl 20 mg to be taken 3 times a day as needed.  We have discussed IBS mixed symptoms and how we will have to alter treatment when she is constipated and having diarrhea.  We discussed healthy lifestyle.  We will follow-up in the office after endoscopy.  Patient is agreeable to this plan and will call the office with any questions or concerns.              Follow Up   Return for Follow up after endoscopy in office.  Patient was given instructions and counseling regarding her condition or for health maintenance advice. Please see specific information pulled into the AVS if appropriate.

## 2021-09-02 NOTE — PATIENT INSTRUCTIONS
Colon Polyps    Polyps are tissue growths inside the body. Polyps can grow in many places, including the large intestine (colon). A polyp may be a round bump or a mushroom-shaped growth. You could have one polyp or several.  Most colon polyps are noncancerous (benign). However, some colon polyps can become cancerous over time. Finding and removing the polyps early can help prevent this.  What are the causes?  The exact cause of colon polyps is not known.  What increases the risk?  You are more likely to develop this condition if you:  · Have a family history of colon cancer or colon polyps.  · Are older than 50 or older than 45 if you are .  · Have inflammatory bowel disease, such as ulcerative colitis or Crohn's disease.  · Have certain hereditary conditions, such as:  ? Familial adenomatous polyposis.  ? Johnson syndrome.  ? Turcot syndrome.  ? Peutz-Jeghers syndrome.  · Are overweight.  · Smoke cigarettes.  · Do not get enough exercise.  · Drink too much alcohol.  · Eat a diet that is high in fat and red meat and low in fiber.  · Had childhood cancer that was treated with abdominal radiation.  What are the signs or symptoms?  Most polyps do not cause symptoms.  If you have symptoms, they may include:  · Blood coming from your rectum when having a bowel movement.  · Blood in your stool. The stool may look dark red or black.  · Abdominal pain.  · A change in bowel habits, such as constipation or diarrhea.  How is this diagnosed?  This condition is diagnosed with a colonoscopy. This is a procedure in which a lighted, flexible scope is inserted into the anus and then passed into the colon to examine the area. Polyps are sometimes found when a colonoscopy is done as part of routine cancer screening tests.  How is this treated?  Treatment for this condition involves removing any polyps that are found. Most polyps can be removed during a colonoscopy. Those polyps will then be tested for cancer. Additional  treatment may be needed depending on the results of testing.  Follow these instructions at home:  Lifestyle  · Maintain a healthy weight, or lose weight if recommended by your health care provider.  · Exercise every day or as told by your health care provider.  · Do not use any products that contain nicotine or tobacco, such as cigarettes and e-cigarettes. If you need help quitting, ask your health care provider.  · If you drink alcohol, limit how much you have:  ? 0-1 drink a day for women.  ? 0-2 drinks a day for men.  · Be aware of how much alcohol is in your drink. In the [place] one drink equals one 12 oz bottle of beer (355 mL), one 5 oz glass of wine (148 mL), or one 1½ oz shot of hard liquor (44 mL).  Eating and drinking    · Eat foods that are high in fiber, such as fruits, vegetables, and whole grains.  · Eat foods that are high in calcium and vitamin D, such as milk, cheese, yogurt, eggs, liver, fish, and broccoli.  · Limit foods that are high in fat, such as fried foods and desserts.  · Limit the amount of red meat and processed meat you eat, such as hot dogs, sausage, enciso, and lunch meats.  General instructions  · Keep all follow-up visits as told by your health care provider. This is important.  ? This includes having regularly scheduled colonoscopies.  ? Talk to your health care provider about when you need a colonoscopy.  Contact a health care provider if:  · You have new or worsening bleeding during a bowel movement.  · You have new or increased blood in your stool.  · You have a change in bowel habits.  · You lose weight for no known reason.  Summary  · Polyps are tissue growths inside the body. Polyps can grow in many places, including the colon.  · Most colon polyps are noncancerous (benign), but some can become cancerous over time.  · This condition is diagnosed with a colonoscopy.  · Treatment for this condition involves removing any polyps that are found. Most polyps can be removed during a  colonoscopy.  This information is not intended to replace advice given to you by your health care provider. Make sure you discuss any questions you have with your health care provider.  Document Revised: 04/04/2019 Document Reviewed: 04/04/2019  Elsevier Patient Education © 2021 [employer]

## 2021-10-29 ENCOUNTER — LAB (OUTPATIENT)
Dept: LAB | Facility: HOSPITAL | Age: 56
End: 2021-10-29

## 2021-10-29 DIAGNOSIS — K21.9 GASTROESOPHAGEAL REFLUX DISEASE, UNSPECIFIED WHETHER ESOPHAGITIS PRESENT: ICD-10-CM

## 2021-10-29 DIAGNOSIS — Z86.010 PERSONAL HISTORY OF COLONIC POLYPS: ICD-10-CM

## 2021-10-29 DIAGNOSIS — K58.1 IRRITABLE BOWEL SYNDROME WITH CONSTIPATION: ICD-10-CM

## 2021-10-29 LAB — SARS-COV-2 N GENE RESP QL NAA+PROBE: NOT DETECTED

## 2021-10-29 PROCEDURE — 87635 SARS-COV-2 COVID-19 AMP PRB: CPT

## 2021-10-29 PROCEDURE — C9803 HOPD COVID-19 SPEC COLLECT: HCPCS

## 2021-11-03 ENCOUNTER — ANESTHESIA EVENT (OUTPATIENT)
Dept: GASTROENTEROLOGY | Facility: HOSPITAL | Age: 56
End: 2021-11-03

## 2021-11-03 ENCOUNTER — HOSPITAL ENCOUNTER (OUTPATIENT)
Facility: HOSPITAL | Age: 56
Setting detail: HOSPITAL OUTPATIENT SURGERY
Discharge: HOME | End: 2021-11-03
Attending: INTERNAL MEDICINE | Admitting: INTERNAL MEDICINE

## 2021-11-03 ENCOUNTER — ANESTHESIA (OUTPATIENT)
Dept: GASTROENTEROLOGY | Facility: HOSPITAL | Age: 56
End: 2021-11-03

## 2021-11-03 VITALS
HEIGHT: 62 IN | TEMPERATURE: 98 F | WEIGHT: 259.92 LBS | HEART RATE: 77 BPM | OXYGEN SATURATION: 95 % | RESPIRATION RATE: 20 BRPM | DIASTOLIC BLOOD PRESSURE: 67 MMHG | SYSTOLIC BLOOD PRESSURE: 101 MMHG | BODY MASS INDEX: 47.83 KG/M2

## 2021-11-03 DIAGNOSIS — K21.9 GASTROESOPHAGEAL REFLUX DISEASE, UNSPECIFIED WHETHER ESOPHAGITIS PRESENT: ICD-10-CM

## 2021-11-03 DIAGNOSIS — Z86.010 PERSONAL HISTORY OF COLONIC POLYPS: ICD-10-CM

## 2021-11-03 DIAGNOSIS — K58.1 IRRITABLE BOWEL SYNDROME WITH CONSTIPATION: ICD-10-CM

## 2021-11-03 LAB — GLUCOSE BLDC GLUCOMTR-MCNC: 148 MG/DL (ref 70–99)

## 2021-11-03 PROCEDURE — 45380 COLONOSCOPY AND BIOPSY: CPT | Performed by: INTERNAL MEDICINE

## 2021-11-03 PROCEDURE — 82962 GLUCOSE BLOOD TEST: CPT

## 2021-11-03 PROCEDURE — 88305 TISSUE EXAM BY PATHOLOGIST: CPT | Performed by: INTERNAL MEDICINE

## 2021-11-03 PROCEDURE — 43239 EGD BIOPSY SINGLE/MULTIPLE: CPT | Performed by: INTERNAL MEDICINE

## 2021-11-03 PROCEDURE — 25010000002 PROPOFOL 10 MG/ML EMULSION: Performed by: NURSE ANESTHETIST, CERTIFIED REGISTERED

## 2021-11-03 RX ORDER — PROPOFOL 10 MG/ML
VIAL (ML) INTRAVENOUS AS NEEDED
Status: DISCONTINUED | OUTPATIENT
Start: 2021-11-03 | End: 2021-11-03 | Stop reason: SURG

## 2021-11-03 RX ORDER — DEXMEDETOMIDINE HYDROCHLORIDE 100 UG/ML
INJECTION, SOLUTION INTRAVENOUS AS NEEDED
Status: DISCONTINUED | OUTPATIENT
Start: 2021-11-03 | End: 2021-11-03 | Stop reason: SURG

## 2021-11-03 RX ORDER — SODIUM CHLORIDE, SODIUM LACTATE, POTASSIUM CHLORIDE, CALCIUM CHLORIDE 600; 310; 30; 20 MG/100ML; MG/100ML; MG/100ML; MG/100ML
30 INJECTION, SOLUTION INTRAVENOUS CONTINUOUS
Status: DISCONTINUED | OUTPATIENT
Start: 2021-11-03 | End: 2021-11-03 | Stop reason: HOSPADM

## 2021-11-03 RX ORDER — SODIUM CHLORIDE, SODIUM LACTATE, POTASSIUM CHLORIDE, CALCIUM CHLORIDE 600; 310; 30; 20 MG/100ML; MG/100ML; MG/100ML; MG/100ML
INJECTION, SOLUTION INTRAVENOUS CONTINUOUS PRN
Status: DISCONTINUED | OUTPATIENT
Start: 2021-11-03 | End: 2021-11-03 | Stop reason: SURG

## 2021-11-03 RX ORDER — KETAMINE HYDROCHLORIDE 50 MG/ML
INJECTION, SOLUTION, CONCENTRATE INTRAMUSCULAR; INTRAVENOUS AS NEEDED
Status: DISCONTINUED | OUTPATIENT
Start: 2021-11-03 | End: 2021-11-03 | Stop reason: SURG

## 2021-11-03 RX ADMIN — PROPOFOL 30 MG: 10 INJECTION, EMULSION INTRAVENOUS at 15:54

## 2021-11-03 RX ADMIN — SODIUM CHLORIDE, POTASSIUM CHLORIDE, SODIUM LACTATE AND CALCIUM CHLORIDE: 600; 310; 30; 20 INJECTION, SOLUTION INTRAVENOUS at 15:39

## 2021-11-03 RX ADMIN — PROPOFOL 30 MG: 10 INJECTION, EMULSION INTRAVENOUS at 15:55

## 2021-11-03 RX ADMIN — DEXMEDETOMIDINE HYDROCHLORIDE 12 MCG: 100 INJECTION, SOLUTION, CONCENTRATE INTRAVENOUS at 15:43

## 2021-11-03 RX ADMIN — KETAMINE HYDROCHLORIDE 10 MG: 50 INJECTION, SOLUTION INTRAMUSCULAR; INTRAVENOUS at 15:48

## 2021-11-03 RX ADMIN — SODIUM CHLORIDE, POTASSIUM CHLORIDE, SODIUM LACTATE AND CALCIUM CHLORIDE 30 ML/HR: 600; 310; 30; 20 INJECTION, SOLUTION INTRAVENOUS at 14:42

## 2021-11-03 RX ADMIN — DEXMEDETOMIDINE HYDROCHLORIDE 8 MCG: 100 INJECTION, SOLUTION, CONCENTRATE INTRAVENOUS at 15:52

## 2021-11-03 RX ADMIN — KETAMINE HYDROCHLORIDE 15 MG: 50 INJECTION, SOLUTION INTRAMUSCULAR; INTRAVENOUS at 15:43

## 2021-11-03 RX ADMIN — KETAMINE HYDROCHLORIDE 15 MG: 50 INJECTION, SOLUTION INTRAMUSCULAR; INTRAVENOUS at 15:59

## 2021-11-03 RX ADMIN — PROPOFOL 30 MG: 10 INJECTION, EMULSION INTRAVENOUS at 15:48

## 2021-11-03 RX ADMIN — PROPOFOL 30 MG: 10 INJECTION, EMULSION INTRAVENOUS at 15:50

## 2021-11-03 RX ADMIN — DEXMEDETOMIDINE HYDROCHLORIDE 8 MCG: 100 INJECTION, SOLUTION, CONCENTRATE INTRAVENOUS at 15:49

## 2021-11-03 RX ADMIN — DEXMEDETOMIDINE HYDROCHLORIDE 12 MCG: 100 INJECTION, SOLUTION, CONCENTRATE INTRAVENOUS at 15:48

## 2021-11-03 RX ADMIN — PROPOFOL 40 MG: 10 INJECTION, EMULSION INTRAVENOUS at 15:46

## 2021-11-03 RX ADMIN — PROPOFOL 30 MG: 10 INJECTION, EMULSION INTRAVENOUS at 15:56

## 2021-11-03 RX ADMIN — PROPOFOL 70 MG: 10 INJECTION, EMULSION INTRAVENOUS at 15:43

## 2021-11-03 RX ADMIN — KETAMINE HYDROCHLORIDE 10 MG: 50 INJECTION, SOLUTION INTRAMUSCULAR; INTRAVENOUS at 15:50

## 2021-11-03 RX ADMIN — PROPOFOL 20 MG: 10 INJECTION, EMULSION INTRAVENOUS at 15:52

## 2021-11-03 RX ADMIN — PROPOFOL 30 MG: 10 INJECTION, EMULSION INTRAVENOUS at 16:03

## 2021-11-03 RX ADMIN — PROPOFOL 30 MG: 10 INJECTION, EMULSION INTRAVENOUS at 16:00

## 2021-11-03 NOTE — ANESTHESIA POSTPROCEDURE EVALUATION
Patient: Leonel Hameed    Procedure Summary     Date: 11/03/21 Room / Location: [facility] ENDOSCOPY 2 / [facility] ENDOSCOPY    Anesthesia Start: 1539 Anesthesia Stop: 1608    Procedures:       ESOPHAGOGASTRODUODENOSCOPY WITH BIOPSIES (N/A )      COLONOSCOPY WITH BIOPSIES (N/A ) Diagnosis:       Irritable bowel syndrome with constipation      Gastroesophageal reflux disease, unspecified whether esophagitis present      Personal history of colonic polyps      (Irritable bowel syndrome with constipation [K58.1])      (Gastroesophageal reflux disease, unspecified whether esophagitis present [K21.9])      (Personal history of colonic polyps [Z86.010])    Surgeons: Keegan Thompson MD Provider: Baldo Muñiz MD    Anesthesia Type: general ASA Status: 4          Anesthesia Type: general    Vitals  Vitals Value Taken Time   BP 95/57 11/03/21 1635   Temp 36.7 °C (98 °F) 11/03/21 1610   Pulse 70 11/03/21 1639   Resp 20 11/03/21 1620   SpO2 94 % 11/03/21 1639   Vitals shown include unvalidated device data.        Post Anesthesia Care and Evaluation    Patient location during evaluation: bedside  Patient participation: complete - patient participated  Level of consciousness: awake  Pain score: 0  Pain management: adequate  Airway patency: patent  Anesthetic complications: No anesthetic complications  PONV Status: none  Cardiovascular status: acceptable and stable  Respiratory status: acceptable and room air  Hydration status: acceptable    Comments: An Anesthesiologist personally participated in the most demanding procedures (including induction and emergence if applicable) in the anesthesia plan, monitored the course of anesthesia administration at frequent intervals and remained physically present and available for immediate diagnosis and treatment of emergencies.

## 2021-11-03 NOTE — ANESTHESIA PREPROCEDURE EVALUATION
Anesthesia Evaluation     Patient summary reviewed and Nursing notes reviewed                Airway   Mallampati: I  TM distance: >3 FB  Neck ROM: full  No difficulty expected  Dental      Pulmonary - normal exam    breath sounds clear to auscultation  (+) asthma,  Cardiovascular - normal exam    Rhythm: regular    (+) hypertension, hyperlipidemia,       Neuro/Psych  (+) numbness,     GI/Hepatic/Renal/Endo    (+) morbid obesity, GERD,      Musculoskeletal     Abdominal    Substance History - negative use     OB/GYN negative ob/gyn ROS         Other   arthritis,    history of cancer                  Anesthesia Plan    ASA 4     general     intravenous induction     Anesthetic plan, all risks, benefits, and alternatives have been provided, discussed and informed consent has been obtained with: patient.

## 2021-11-03 NOTE — H&P
Pre Procedure History & Physical    Chief Complaint:   gerd  IBS  Constipation  H/o colon polyps    Subjective     HPI:   As above    Past Medical History:   Past Medical History:   Diagnosis Date   • Aftercare 2017    Following left carpal tunnel release   • Arthritis    • Asthma    • Bladder problem    • Cancer (HCC)     uterine   • Carpal tunnel syndrome    • Constipation    • DDD (degenerative disc disease)    • GERD (gastroesophageal reflux disease)    • HLD (hyperlipidemia)    • HTN (hypertension)    • Leg pain    • Low back pain     Lumbago   • Seasonal allergies        Past Surgical History:  Past Surgical History:   Procedure Laterality Date   • CARPAL TUNNEL RELEASE Bilateral 2016   •  SECTION     • CHOLECYSTECTOMY     • COLON SURGERY     • COLONOSCOPY     • ENDOSCOPY     • HYSTERECTOMY     • TUBAL ABDOMINAL LIGATION         Family History:  Family History   Problem Relation Age of Onset   • Diabetes Mother    • Arthritis Mother    • Diabetes Father    • Arthritis Father    • Cancer Father    • Diabetes Brother    • Heart disease Brother    • Malig Hyperthermia Neg Hx        Social History:   reports that she has never smoked. She has never used smokeless tobacco. She reports that she does not drink alcohol and does not use drugs.    Medications:   Medications Prior to Admission   Medication Sig Dispense Refill Last Dose   • albuterol sulfate  (90 Base) MCG/ACT inhaler Inhale 1 puff 2 (Two) Times a Day As Needed.   Past Week at Unknown time   • aspirin 81 MG chewable tablet Chew 1 tablet Daily.   2021   • azelastine (ASTELIN) 0.1 % nasal spray 1 spray into the nostril(s) as directed by provider 2 (Two) Times a Day.   Past Week at Unknown time   • baclofen (LIORESAL) 10 MG tablet Take 1 tablet by mouth Daily.   Past Week at Unknown time   • cetirizine (zyrTEC) 10 MG tablet Take 1 tablet by mouth Daily.   Past Week at Unknown time   • cyclobenzaprine (FLEXERIL) 10 MG  tablet Take 1 tablet by mouth Daily As Needed.   Past Week at Unknown time   • diclofenac (VOLTAREN) 75 MG EC tablet Take 1 tablet by mouth Daily.   Past Week at Unknown time   • dicyclomine (BENTYL) 20 MG tablet Take 1 tablet by mouth Every 6 (Six) Hours. 90 tablet 3 Past Week at Unknown time   • diphenhydrAMINE (Benadryl Allergy) 25 mg capsule Take 1 capsule by mouth Daily As Needed.   Past Week at Unknown time   • docusate sodium 100 MG capsule Take 1 capsule by mouth Daily.   Past Week at Unknown time   • doxycycline (VIBRAMYCIN) 100 MG capsule Take 1 capsule by mouth 2 (two) times a day.   Past Week at Unknown time   • DULoxetine (CYMBALTA) 60 MG capsule Take 60 mg by mouth Daily.   Past Week at Unknown time   • Dupixent 200 MG/1.14ML solution prefilled syringe Inject 1 dose under the skin into the appropriate area as directed Every 14 (Fourteen) Days.   Past Week at Unknown time   • fluticasone (FLONASE) 50 MCG/ACT nasal spray 1 spray by Each Nare route 2 (Two) Times a Day. shake liquid      • HYDROcodone-acetaminophen (Norco) 7.5-325 MG per tablet Take 1 tablet by mouth 3 (Three) Times a Day.   Past Week at Unknown time   • ibuprofen (ADVIL,MOTRIN) 800 MG tablet Take 1 tablet by mouth As Needed.   Past Week at Unknown time   • ketotifen (ZADITOR) 0.025 % ophthalmic solution Administer 1 drop to both eyes Daily As Needed.   Past Week at Unknown time   • lisinopril (PRINIVIL,ZESTRIL) 20 MG tablet Take 1 tablet by mouth Every Night.   Past Week at Unknown time   • loratadine (Claritin) 10 MG tablet Take 1 tablet by mouth Daily.   Past Week at Unknown time   • metFORMIN (GLUCOPHAGE) 500 MG tablet Take 1 tablet by mouth 2 (two) times a day.   Past Week at Unknown time   • montelukast (SINGULAIR) 10 MG tablet Take 1 tablet by mouth Daily.   Past Week at Unknown time   • Movantik 25 MG tablet Take 1 tablet by mouth Daily.   Past Week at Unknown time   • omeprazole (priLOSEC) 40 MG capsule Take 1 capsule by mouth  "Daily. 30 capsule 5 Past Week at Unknown time   • polyethylene glycol (GoLYTELY) 236 g solution Instructions provided in the office. Colon prep. 4000 mL 0 11/3/2021 at Unknown time   • polyethylene glycol (MIRALAX) 17 GM/SCOOP powder Take 1 g by mouth As Needed.   Past Week at Unknown time   • pravastatin (PRAVACHOL) 40 MG tablet Take 40 mg by mouth every night at bedtime.   Past Week at Unknown time   • promethazine (PHENERGAN) 12.5 MG tablet Take 1 tablet by mouth As Needed.   Past Week at Unknown time   • tiZANidine (ZANAFLEX) 4 MG tablet Take 4 mg by mouth Every 8 (Eight) Hours As Needed.   Past Week at Unknown time   • Trelegy Ellipta 100-62.5-25 MCG/INH inhaler Inhale 1 puff Daily.   Past Week at Unknown time   • EPINEPHrine (EPIPEN) 0.3 MG/0.3ML solution auto-injector injection Inject 1 each under the skin into the appropriate area as directed As Needed.   More than a month at Unknown time       Allergies:  Lac bovis        Objective     Blood pressure 130/69, pulse 70, temperature 99.8 °F (37.7 °C), temperature source Core, resp. rate 20, height 157.5 cm (62.01\"), weight 118 kg (259 lb 14.8 oz), SpO2 98 %.    Physical Exam   Constitutional: Pt is oriented to person, place, and time and well-developed, well-nourished, and in no distress.   Mouth/Throat: Oropharynx is clear and moist.   Neck: Normal range of motion.   Cardiovascular: Normal rate, regular rhythm and normal heart sounds.    Pulmonary/Chest: Effort normal and breath sounds normal.   Abdominal: Soft. Nontender  Skin: Skin is warm and dry.   Psychiatric: Mood, memory, affect and judgment normal.     Assessment/Plan     Diagnosis:  gerd  IBS  Constipation  H/o colon polyps        Anticipated Surgical Procedure:  egd  colonoscopy    The risks, benefits, and alternatives of this procedure have been discussed with the patient or the responsible party- the patient understands and agrees to proceed.            "

## 2021-11-03 NOTE — DISCHARGE INSTRUCTIONS
Hiatal Hernia    A hiatal hernia occurs when part of the stomach slides above the muscle that separates the abdomen from the chest (diaphragm). A person can be born with a hiatal hernia (congenital), or it may develop over time. In almost all cases of hiatal hernia, only the top part of the stomach pushes through the diaphragm.  Many people have a hiatal hernia with no symptoms. The larger the hernia, the more likely it is that you will have symptoms. In some cases, a hiatal hernia allows stomach acid to flow back into the tube that carries food from your mouth to your stomach (esophagus). This may cause heartburn symptoms. Severe heartburn symptoms may mean that you have developed a condition called gastroesophageal reflux disease (GERD).  What are the causes?  This condition is caused by a weakness in the opening (hiatus) where the esophagus passes through the diaphragm to attach to the upper part of the stomach. A person may be born with a weakness in the hiatus, or a weakness can develop over time.  What increases the risk?  This condition is more likely to develop in:  · Older people. Age is a major risk factor for a hiatal hernia, especially if you are over the age of 50.  · Pregnant women.  · People who are overweight.  · People who have frequent constipation.  What are the signs or symptoms?  Symptoms of this condition usually develop in the form of GERD symptoms. Symptoms include:  · Heartburn.  · Belching.  · Indigestion.  · Trouble swallowing.  · Coughing or wheezing.  · Sore throat.  · Hoarseness.  · Chest pain.  · Nausea and vomiting.  How is this diagnosed?  This condition may be diagnosed during testing for GERD. Tests that may be done include:  · X-rays of your stomach or chest.  · An upper gastrointestinal (GI) series. This is an X-ray exam of your GI tract that is taken after you swallow a chalky liquid that shows up clearly on the X-ray.  · Endoscopy. This is a procedure to look into your stomach  using a thin, flexible tube that has a tiny camera and light on the end of it.  How is this treated?  This condition may be treated by:  · Dietary and lifestyle changes to help reduce GERD symptoms.  · Medicines. These may include:  ? Over-the-counter antacids.  ? Medicines that make your stomach empty more quickly.  ? Medicines that block the production of stomach acid (H2 blockers).  ? Stronger medicines to reduce stomach acid (proton pump inhibitors).  · Surgery to repair the hernia, if other treatments are not helping.  If you have no symptoms, you may not need treatment.  Follow these instructions at home:  Lifestyle and activity  · Do not use any products that contain nicotine or tobacco, such as cigarettes and e-cigarettes. If you need help quitting, ask your health care provider.  · Try to achieve and maintain a healthy body weight.  · Avoid putting pressure on your abdomen. Anything that puts pressure on your abdomen increases the amount of acid that may be pushed up into your esophagus.  ? Avoid bending over, especially after eating.  ? Raise the head of your bed by putting blocks under the legs. This keeps your head and esophagus higher than your stomach.  ? Do not wear tight clothing around your chest or stomach.  ? Try not to strain when having a bowel movement, when urinating, or when lifting heavy objects.  Eating and drinking  · Avoid foods that can worsen GERD symptoms. These may include:  ? Fatty foods, like fried foods.  ? Citrus fruits, like oranges or lemon.  ? Other foods and drinks that contain acid, like orange juice or tomatoes.  ? Spicy food.  ? Chocolate.  · Eat frequent small meals instead of three large meals a day. This helps prevent your stomach from getting too full.  ? Eat slowly.  ? Do not lie down right after eating.  ? Do not eat 1-2 hours before bed.  · Do not drink beverages with caffeine. These include cola, coffee, cocoa, and tea.  · Do not drink alcohol.  General  instructions  · Take over-the-counter and prescription medicines only as told by your health care provider.  · Keep all follow-up visits as told by your health care provider. This is important.  Contact a health care provider if:  · Your symptoms are not controlled with medicines or lifestyle changes.  · You are having trouble swallowing.  · You have coughing or wheezing that will not go away.  Get help right away if:  · Your pain is getting worse.  · Your pain spreads to your arms, neck, jaw, teeth, or back.  · You have shortness of breath.  · You sweat for no reason.  · You feel sick to your stomach (nauseous) or you vomit.  · You vomit blood.  · You have bright red blood in your stools.  · You have black, tarry stools.  This information is not intended to replace advice given to you by your health care provider. Make sure you discuss any questions you have with your health care provider.  Document Revised: 11/30/2018 Document Reviewed: 07/23/2018  [employer] Patient Education © 2021 [employer]  Hemorrhoids  Hemorrhoids are swollen veins in and around the rectum or anus. There are two types of hemorrhoids:  · Internal hemorrhoids. These occur in the veins that are just inside the rectum. They may poke through to the outside and become irritated and painful.  · External hemorrhoids. These occur in the veins that are outside the anus and can be felt as a painful swelling or hard lump near the anus.  Most hemorrhoids do not cause serious problems, and they can be managed with home treatments such as diet and lifestyle changes. If home treatments do not help the symptoms, procedures can be done to shrink or remove the hemorrhoids.  What are the causes?  This condition is caused by increased pressure in the anal area. This pressure may result from various things, including:  · Constipation.  · Straining to have a bowel movement.  · Diarrhea.  · Pregnancy.  · Obesity.  · Sitting for long periods of time.  · Heavy  lifting or other activity that causes you to strain.  · Anal sex.  · Riding a bike for a long period of time.  What are the signs or symptoms?  Symptoms of this condition include:  · Pain.  · Anal itching or irritation.  · Rectal bleeding.  · Leakage of stool (feces).  · Anal swelling.  · One or more lumps around the anus.  How is this diagnosed?  This condition can often be diagnosed through a visual exam. Other exams or tests may also be done, such as:  · An exam that involves feeling the rectal area with a gloved hand (digital rectal exam).  · An exam of the anal canal that is done using a small tube (anoscope).  · A blood test, if you have lost a significant amount of blood.  · A test to look inside the colon using a flexible tube with a camera on the end (sigmoidoscopy or colonoscopy).  How is this treated?  This condition can usually be treated at home. However, various procedures may be done if dietary changes, lifestyle changes, and other home treatments do not help your symptoms. These procedures can help make the hemorrhoids smaller or remove them completely. Some of these procedures involve surgery, and others do not. Common procedures include:  · Rubber band ligation. Rubber bands are placed at the base of the hemorrhoids to cut off their blood supply.  · Sclerotherapy. Medicine is injected into the hemorrhoids to shrink them.  · Infrared coagulation. A type of light energy is used to get rid of the hemorrhoids.  · Hemorrhoidectomy surgery. The hemorrhoids are surgically removed, and the veins that supply them are tied off.  · Stapled hemorrhoidopexy surgery. The surgeon staples the base of the hemorrhoid to the rectal wall.  Follow these instructions at home:  Eating and drinking    · Eat foods that have a lot of fiber in them, such as whole grains, beans, nuts, fruits, and vegetables.  · Ask your health care provider about taking products that have added fiber (fiber supplements).  · Reduce the amount  of fat in your diet. You can do this by eating low-fat dairy products, eating less red meat, and avoiding processed foods.  · Drink enough fluid to keep your urine pale yellow.    Managing pain and swelling    · Take warm sitz baths for 20 minutes, 3-4 times a day to ease pain and discomfort. You may do this in a bathtub or using a portable sitz bath that fits over the toilet.  · If directed, apply ice to the affected area. Using ice packs between sitz baths may be helpful.  ? Put ice in a plastic bag.  ? Place a towel between your skin and the bag.  ? Leave the ice on for 20 minutes, 2-3 times a day.    General instructions  · Take over-the-counter and prescription medicines only as told by your health care provider.  · Use medicated creams or suppositories as told.  · Get regular exercise. Ask your health care provider how much and what kind of exercise is best for you. In general, you should do moderate exercise for at least 30 minutes on most days of the week (150 minutes each week). This can include activities such as walking, biking, or yoga.  · Go to the bathroom when you have the urge to have a bowel movement. Do not wait.  · Avoid straining to have bowel movements.  · Keep the anal area dry and clean. Use wet toilet paper or moist towelettes after a bowel movement.  · Do not sit on the toilet for long periods of time. This increases blood pooling and pain.  · Keep all follow-up visits as told by your health care provider. This is important.  Contact a health care provider if you have:  · Increasing pain and swelling that are not controlled by treatment or medicine.  · Difficulty having a bowel movement, or you are unable to have a bowel movement.  · Pain or inflammation outside the area of the hemorrhoids.  Get help right away if you have:  · Uncontrolled bleeding from your rectum.  Summary  · Hemorrhoids are swollen veins in and around the rectum or anus.  · Most hemorrhoids can be managed with home  treatments such as diet and lifestyle changes.  · Taking warm sitz baths can help ease pain and discomfort.  · In severe cases, procedures or surgery can be done to shrink or remove the hemorrhoids.  This information is not intended to replace advice given to you by your health care provider. Make sure you discuss any questions you have with your health care provider.  Document Revised: 05/15/2020 Document Reviewed: 05/09/2019  [employer] Patient Education © 2021 [employer]

## 2021-11-05 LAB
CYTO UR: NORMAL
LAB AP CASE REPORT: NORMAL
LAB AP CLINICAL INFORMATION: NORMAL
PATH REPORT.FINAL DX SPEC: NORMAL
PATH REPORT.GROSS SPEC: NORMAL

## 2021-11-30 RX ORDER — DICYCLOMINE HCL 20 MG
TABLET ORAL
Qty: 90 TABLET | Refills: 3 | Status: SHIPPED | OUTPATIENT
Start: 2021-11-30

## 2022-03-09 RX ORDER — OMEPRAZOLE 40 MG/1
40 CAPSULE, DELAYED RELEASE ORAL DAILY
Qty: 30 CAPSULE | Refills: 5 | Status: SHIPPED | OUTPATIENT
Start: 2022-03-09 | End: 2022-08-31

## 2022-08-31 RX ORDER — OMEPRAZOLE 40 MG/1
40 CAPSULE, DELAYED RELEASE ORAL DAILY
Qty: 30 CAPSULE | Refills: 5 | Status: SHIPPED | OUTPATIENT
Start: 2022-08-31

## 2024-04-15 ENCOUNTER — TRANSCRIBE ORDERS (OUTPATIENT)
Dept: ADMINISTRATIVE | Facility: HOSPITAL | Age: 59
End: 2024-04-15
Payer: MEDICARE

## 2024-04-15 ENCOUNTER — HOSPITAL ENCOUNTER (OUTPATIENT)
Dept: GENERAL RADIOLOGY | Facility: HOSPITAL | Age: 59
Discharge: HOME | End: 2024-04-15
Admitting: NURSE PRACTITIONER
Payer: MEDICARE

## 2024-04-15 DIAGNOSIS — M50.322 OTHER CERVICAL DISC DEGENERATION AT C5-C6 LEVEL: ICD-10-CM

## 2024-04-15 DIAGNOSIS — M50.322 OTHER CERVICAL DISC DEGENERATION AT C5-C6 LEVEL: Primary | ICD-10-CM

## 2024-04-15 PROCEDURE — 72040 X-RAY EXAM NECK SPINE 2-3 VW: CPT

## (undated) DEVICE — SINGLE-USE BIOPSY FORCEPS: Brand: RADIAL JAW 4

## (undated) DEVICE — SOL IRRG H2O PL/BG 1000ML STRL

## (undated) DEVICE — COLON KIT: Brand: MEDLINE INDUSTRIES, INC.

## (undated) DEVICE — EGD OR ERCP KIT: Brand: MEDLINE INDUSTRIES, INC.

## (undated) DEVICE — Device: Brand: DEFENDO AIR/WATER/SUCTION AND BIOPSY VALVE